# Patient Record
Sex: MALE | Race: WHITE | NOT HISPANIC OR LATINO | Employment: FULL TIME | URBAN - METROPOLITAN AREA
[De-identification: names, ages, dates, MRNs, and addresses within clinical notes are randomized per-mention and may not be internally consistent; named-entity substitution may affect disease eponyms.]

---

## 2024-08-10 ENCOUNTER — HOSPITAL ENCOUNTER (EMERGENCY)
Facility: HOSPITAL | Age: 49
Discharge: HOME | End: 2024-08-10
Attending: STUDENT IN AN ORGANIZED HEALTH CARE EDUCATION/TRAINING PROGRAM
Payer: COMMERCIAL

## 2024-08-10 VITALS
HEIGHT: 69 IN | SYSTOLIC BLOOD PRESSURE: 163 MMHG | RESPIRATION RATE: 16 BRPM | OXYGEN SATURATION: 98 % | WEIGHT: 315 LBS | BODY MASS INDEX: 46.65 KG/M2 | TEMPERATURE: 97 F | DIASTOLIC BLOOD PRESSURE: 92 MMHG | HEART RATE: 72 BPM
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
## 2024-08-10 DIAGNOSIS — S90.561A INSECT BITE OF RIGHT ANKLE, INITIAL ENCOUNTER: Primary | ICD-10-CM

## 2024-08-10 PROCEDURE — 96374 THER/PROPH/DIAG INJ IV PUSH: CPT

## 2024-08-10 PROCEDURE — 99284 EMERGENCY DEPT VISIT MOD MDM: CPT | Mod: 25

## 2024-08-10 PROCEDURE — 2500000004 HC RX 250 GENERAL PHARMACY W/ HCPCS (ALT 636 FOR OP/ED): Performed by: PHYSICIAN ASSISTANT

## 2024-08-10 RX ORDER — PREDNISONE 50 MG/1
50 TABLET ORAL DAILY
Qty: 10 TABLET | Refills: 0 | Status: SHIPPED | OUTPATIENT
Start: 2024-08-10 | End: 2024-08-20

## 2024-08-10 RX ADMIN — METHYLPREDNISOLONE SODIUM SUCCINATE 125 MG: 125 INJECTION, POWDER, FOR SOLUTION INTRAMUSCULAR; INTRAVENOUS at 13:47

## 2024-08-10 ASSESSMENT — LIFESTYLE VARIABLES
EVER FELT BAD OR GUILTY ABOUT YOUR DRINKING: NO
HAVE YOU EVER FELT YOU SHOULD CUT DOWN ON YOUR DRINKING: NO
TOTAL SCORE: 0
HAVE PEOPLE ANNOYED YOU BY CRITICIZING YOUR DRINKING: NO
EVER HAD A DRINK FIRST THING IN THE MORNING TO STEADY YOUR NERVES TO GET RID OF A HANGOVER: NO

## 2024-08-10 ASSESSMENT — PAIN - FUNCTIONAL ASSESSMENT
PAIN_FUNCTIONAL_ASSESSMENT: 0-10
PAIN_FUNCTIONAL_ASSESSMENT: 0-10

## 2024-08-10 ASSESSMENT — PAIN DESCRIPTION - PROGRESSION: CLINICAL_PROGRESSION: NOT CHANGED

## 2024-08-10 ASSESSMENT — PAIN SCALES - GENERAL
PAINLEVEL_OUTOF10: 0 - NO PAIN

## 2024-08-10 NOTE — ED PROVIDER NOTES
HPI   Chief Complaint   Patient presents with   • Allergic Reaction     Pt was mowing lawn at round 10:30 and was bit on right leg near sock line. PT was short of breath and is not currently. He took an unknown number of benadryl while on the way to urgent care. Urgent care sent him here.        48-year-old otherwise healthy male who was cutting his lawn when he was stung in the anterior right ankle about 10:30 AM this morning.  Patient states he took a total of 6 Benadryl tablets.  Patient states later in the day he went to the urgent care where they gave him a shot of epi.  Urgent care center felt it would be best that he be evaluated in an ER, contacted EMS who transported the patient here for further evaluation and treatment.  Currently patient denies difficulty breathing denies difficulty swallowing states he just feels very sleepy.              Patient History   No past medical history on file.  No past surgical history on file.  No family history on file.  Social History     Tobacco Use   • Smoking status: Not on file   • Smokeless tobacco: Not on file   Substance Use Topics   • Alcohol use: Not on file   • Drug use: Not on file       Physical Exam   ED Triage Vitals [08/10/24 1331]   Temperature Heart Rate Respirations BP   36.1 °C (97 °F) 80 18 144/84      Pulse Ox Temp src Heart Rate Source Patient Position   96 % -- -- --      BP Location FiO2 (%)     Left arm --       Physical Exam  Vitals and nursing note reviewed.   Constitutional:       General: He is not in acute distress.     Appearance: He is well-developed.   HENT:      Head: Normocephalic and atraumatic.      Mouth/Throat:      Comments: Posterior pharynx demonstrates no erythema or exudate mucous membranes are moist uvula midline no stridor  Eyes:      Conjunctiva/sclera: Conjunctivae normal.   Cardiovascular:      Rate and Rhythm: Normal rate and regular rhythm.      Heart sounds: No murmur heard.  Pulmonary:      Effort: Pulmonary effort is  normal. No respiratory distress.      Breath sounds: Normal breath sounds. No wheezing.   Abdominal:      Palpations: Abdomen is soft.      Tenderness: There is no abdominal tenderness.   Musculoskeletal:         General: No swelling.      Cervical back: Neck supple.   Skin:     General: Skin is warm and dry.      Capillary Refill: Capillary refill takes less than 2 seconds.      Comments: Patient demonstrates small amount of soft tissue swelling/erythema to the anterior right ankle.   Neurological:      Mental Status: He is alert.   Psychiatric:         Mood and Affect: Mood normal.           ED Course & MDM   Diagnoses as of 08/10/24 1449   Insect bite of right ankle, initial encounter                 No data recorded     Isaias Coma Scale Score: 15 (08/10/24 1334 : Kavon Balbuena RN)                           Medical Decision Making  HPI:48-year-old otherwise healthy male who was cutting his lawn when he was stung in the anterior right ankle about 10:30 AM this morning.  Patient states he took a total of 6 Benadryl tablets.  Patient states later in the day he went to the urgent care where they gave him a shot of epi.  Urgent care center felt it would be best that he be evaluated in an ER, contacted EMS who transported the patient here for further evaluation and treatment.  Currently patient denies difficulty breathing denies difficulty swallowing states he just feels very sleepy.      Differential diagnosis: Insect bite, bee sting, puncture wound    Pertinent physical exam: Awake alert and oriented no acute distress skin is warm and dry.  Posterior pharynx stent demonstrates no erythema or exudate mucous membranes are moist, no soft tissue swelling uvula is midline no stridor.  Lungs are clear to auscultation bilaterally no wheezing no accessory muscle use.    Laboratory results:    Radiologic results:    EKG results: Please see procedure note    ED course and treatment: In the emergency department patient had  an IV established by EMS.  Patient was given Solu-Medrol IV.  Patient had already taken significant amount of Benadryl and had been giving him epinephrine at the urgent care.  I carefully advised patient to take no more than 2 Benadryl every 6 hours in the future.  Patient will be discharged with prescription for prednisone.  Reexamination of this patient at 245 demonstrates patient resting comfortably wishing to go home.  Patient agrees with findings diagnosis and plan    Diagnosis: Insect bite right ankle    Disposition: Home    Social determinants of health: None    *This documentation is completed using the Dragon Dictation system. There may be spelling and/or grammatical errors that were not corrected prior to final submission. I apologize for any inconvenience.*              Procedure  Procedures     Mitchell Braun PA-C  08/10/24 4016